# Patient Record
Sex: FEMALE | URBAN - METROPOLITAN AREA
[De-identification: names, ages, dates, MRNs, and addresses within clinical notes are randomized per-mention and may not be internally consistent; named-entity substitution may affect disease eponyms.]

---

## 2020-10-19 ENCOUNTER — TELEPHONE (OUTPATIENT)
Dept: FAMILY MEDICINE CLINIC | Age: 55
End: 2020-10-19

## 2020-10-20 ENCOUNTER — TELEPHONE (OUTPATIENT)
Dept: FAMILY MEDICINE CLINIC | Age: 55
End: 2020-10-20

## 2020-10-20 NOTE — TELEPHONE ENCOUNTER
Advised patient her emg indicated carpal tunnel syndrome. We refer our patients to Dr. Nima Sapp in [facility]. Patient is to check with her insurance to see if they cover and let us know. Patient states [facility] had misled her on her charges with EMG. Advised patient to contac her insurance company regarding this matter.

## 2022-11-29 ENCOUNTER — HOSPITAL ENCOUNTER (OUTPATIENT)
Age: 57
Discharge: HOME | End: 2022-12-01
Payer: COMMERCIAL

## 2022-11-29 ENCOUNTER — HOSPITAL ENCOUNTER (OUTPATIENT)
Dept: GENERAL RADIOLOGY | Age: 57
Discharge: HOME | End: 2022-12-01
Payer: COMMERCIAL

## 2022-11-29 ENCOUNTER — OFFICE VISIT (OUTPATIENT)
Dept: PRIMARY CARE CLINIC | Age: 57
End: 2022-11-29
Payer: COMMERCIAL

## 2022-11-29 VITALS
HEIGHT: 65 IN | SYSTOLIC BLOOD PRESSURE: 118 MMHG | HEART RATE: 73 BPM | BODY MASS INDEX: 18.73 KG/M2 | DIASTOLIC BLOOD PRESSURE: 70 MMHG | WEIGHT: 112.4 LBS | OXYGEN SATURATION: 97 %

## 2022-11-29 DIAGNOSIS — S29.9XXA TRAUMATIC INJURY OF RIB: ICD-10-CM
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
## 2022-11-29 DIAGNOSIS — S20.211A CHEST WALL CONTUSION, RIGHT, INITIAL ENCOUNTER: Primary | ICD-10-CM

## 2022-11-29 DIAGNOSIS — S20.211A CHEST WALL CONTUSION, RIGHT, INITIAL ENCOUNTER: ICD-10-CM

## 2022-11-29 PROCEDURE — 71101 X-RAY EXAM UNILAT RIBS/CHEST: CPT

## 2022-11-29 PROCEDURE — 99213 OFFICE O/P EST LOW 20 MIN: CPT

## 2022-11-29 RX ORDER — LIDOCAINE 50 MG/G
1 PATCH TOPICAL DAILY
Qty: 10 PATCH | Refills: 0 | Status: SHIPPED | OUTPATIENT
Start: 2022-11-29 | End: 2022-12-09

## 2022-11-29 RX ORDER — IBUPROFEN 600 MG/1
600 TABLET ORAL EVERY 6 HOURS PRN
Qty: 56 TABLET | Refills: 0 | Status: SHIPPED | OUTPATIENT
Start: 2022-11-29 | End: 2022-12-13

## 2022-11-29 ASSESSMENT — ENCOUNTER SYMPTOMS
WHEEZING: 0
VOMITING: 0
TROUBLE SWALLOWING: 0
ABDOMINAL PAIN: 0
SHORTNESS OF BREATH: 0
COLOR CHANGE: 1
BACK PAIN: 0
FACIAL SWELLING: 0
NAUSEA: 0

## 2022-11-29 NOTE — PATIENT INSTRUCTIONS
Imaging ordered, please have completed. Use Ibuprofen as needed for pain/inflammation. Recommend heat application, ice for acute relief. Apply topical Lidocaine patches as needed. Follow-up as needed.

## 2022-11-29 NOTE — PROGRESS NOTES
[address] WALK-IN  [address] [place] [address] WALK-IN  [address] [place] [address] [id number]  Dept: [phone]    Luis Tracy is a 61 y.o. female New patient, who presents to the walk-in clinic today with conditions/complaints as noted below:    Chief Complaint   Patient presents with    Basia Farley last Thursday. Stood up and hit the stair post corner into her right lower back and fell. Ride side pain. HPI:     Patient is a 69-year-old female that presents today for evaluation following a fall that occurred on Thursday. States that she stood up from a table while holding her grandchild and tripped over a vacuum. The right side of her chest/ribs impacted with the corner piece of a wooden banister. Denies any head injury, loss of consciousness, or blood loss. She takes a daily baby Aspirin. Her pain is located to her lateral right ribs/chest wall, denies any radiation. The pain has been worsening since the injury. Reports pressure with occasional sharp episodes and rates it 8/9 out of 10. Aggravating factors include taking a deep breath and movement. She doesn't generally like taking medications and hasn't taken anything. She has been applying heat packs with mild relief. Denies any shortness of breath or difficulty breathing. History reviewed. No pertinent past medical history. Current Outpatient Medications   Medication Sig Dispense Refill    ibuprofen (ADVIL;MOTRIN) 600 MG tablet Take 1 tablet by mouth every 6 hours as needed for Pain 56 tablet 0    lidocaine (LIDODERM) 5 % Place 1 patch onto the skin daily for 10 days 12 hours on, 12 hours off. 10 patch 0     No current facility-administered medications for this visit.        Allergies   Allergen Reactions    Sulfa Antibiotics Other (See Comments)       :     Review of Skin is warm and dry. Findings: Bruising present. Neurological:      General: No focal deficit present. Mental Status: She is alert and oriented to person, place, and time. Psychiatric:         Mood and Affect: Mood normal.         Behavior: Behavior normal.         :          1. Chest wall contusion, right, initial encounter  -     XR RIBS RIGHT INCLUDE CHEST (MIN 3 VIEWS); Future  -     ibuprofen (ADVIL;MOTRIN) 600 MG tablet; Take 1 tablet by mouth every 6 hours as needed for Pain, Disp-56 tablet, R-0Normal  -     lidocaine (LIDODERM) 5 %; Place 1 patch onto the skin daily for 10 days 12 hours on, 12 hours off., Disp-10 patch, R-0Normal  2. Traumatic injury of rib  -     XR RIBS RIGHT INCLUDE CHEST (MIN 3 VIEWS); Future  -     ibuprofen (ADVIL;MOTRIN) 600 MG tablet; Take 1 tablet by mouth every 6 hours as needed for Pain, Disp-56 tablet, R-0Normal  -     lidocaine (LIDODERM) 5 %; Place 1 patch onto the skin daily for 10 days 12 hours on, 12 hours off., Disp-10 patch, R-0Normal  3. Fall against sharp object, initial encounter     :      Return if symptoms worsen or fail to improve. Orders Placed This Encounter   Medications    ibuprofen (ADVIL;MOTRIN) 600 MG tablet     Sig: Take 1 tablet by mouth every 6 hours as needed for Pain     Dispense:  56 tablet     Refill:  0    lidocaine (LIDODERM) 5 %     Sig: Place 1 patch onto the skin daily for 10 days 12 hours on, 12 hours off. Dispense:  10 patch     Refill:  0      Imaging ordered, will call with results. Use ibuprofen as needed for pain/inflammation. Recommend heat application, use ice for acute relief. May apply topical Lidocaine patch as needed. Seek medical attention immediately for worsening pain, shortness of breath, or difficulty breathing. Follow-up as needed. Patient and/or parent given educational materials - see patient instructions. Discussed use, benefit, and side effects of prescribed medications.   All patient questions answered. Patient and/or parent voiced understanding.       Electronically signed by ROXANNE Urias 11/29/2022 at 12:58 PM